# Patient Record
Sex: FEMALE | Race: WHITE | NOT HISPANIC OR LATINO | Employment: FULL TIME | URBAN - METROPOLITAN AREA
[De-identification: names, ages, dates, MRNs, and addresses within clinical notes are randomized per-mention and may not be internally consistent; named-entity substitution may affect disease eponyms.]

---

## 2018-04-19 ENCOUNTER — OFFICE VISIT (OUTPATIENT)
Dept: INTERNAL MEDICINE | Facility: CLINIC | Age: 58
End: 2018-04-19
Payer: COMMERCIAL

## 2018-04-19 VITALS
SYSTOLIC BLOOD PRESSURE: 110 MMHG | HEIGHT: 66 IN | TEMPERATURE: 98 F | WEIGHT: 181 LBS | OXYGEN SATURATION: 97 % | DIASTOLIC BLOOD PRESSURE: 76 MMHG | HEART RATE: 92 BPM | BODY MASS INDEX: 29.09 KG/M2

## 2018-04-19 DIAGNOSIS — E11.9 TYPE 2 DIABETES MELLITUS WITHOUT COMPLICATION, WITH LONG-TERM CURRENT USE OF INSULIN: ICD-10-CM

## 2018-04-19 DIAGNOSIS — Z79.4 TYPE 2 DIABETES MELLITUS WITHOUT COMPLICATION, WITH LONG-TERM CURRENT USE OF INSULIN: ICD-10-CM

## 2018-04-19 DIAGNOSIS — R63.4 UNINTENDED WEIGHT LOSS: ICD-10-CM

## 2018-04-19 DIAGNOSIS — R10.13 EPIGASTRIC PAIN: Primary | ICD-10-CM

## 2018-04-19 DIAGNOSIS — F32.A DEPRESSION, UNSPECIFIED DEPRESSION TYPE: ICD-10-CM

## 2018-04-19 DIAGNOSIS — K59.09 CHRONIC CONSTIPATION: ICD-10-CM

## 2018-04-19 DIAGNOSIS — R07.89 ATYPICAL CHEST PAIN: ICD-10-CM

## 2018-04-19 DIAGNOSIS — E78.5 HYPERLIPIDEMIA, UNSPECIFIED HYPERLIPIDEMIA TYPE: ICD-10-CM

## 2018-04-19 PROCEDURE — 99204 OFFICE O/P NEW MOD 45 MIN: CPT | Mod: ,,, | Performed by: INTERNAL MEDICINE

## 2018-04-19 RX ORDER — TRAZODONE HYDROCHLORIDE 100 MG/1
100 TABLET ORAL NIGHTLY
Qty: 90 TABLET | Refills: 1 | Status: SHIPPED | OUTPATIENT
Start: 2018-04-19 | End: 2018-10-10 | Stop reason: SDUPTHER

## 2018-04-19 RX ORDER — ROSUVASTATIN CALCIUM 20 MG/1
20 TABLET, COATED ORAL DAILY
COMMUNITY
End: 2018-04-19 | Stop reason: SDUPTHER

## 2018-04-19 RX ORDER — INSULIN LISPRO 100 [IU]/ML
INJECTION, SOLUTION INTRAVENOUS; SUBCUTANEOUS
Refills: 3 | COMMUNITY
Start: 2018-03-06 | End: 2018-04-19 | Stop reason: SDUPTHER

## 2018-04-19 RX ORDER — INSULIN LISPRO 100 [IU]/ML
15 INJECTION, SOLUTION INTRAVENOUS; SUBCUTANEOUS
Qty: 3 BOX | Refills: 1 | Status: SHIPPED | OUTPATIENT
Start: 2018-04-19 | End: 2021-01-14

## 2018-04-19 RX ORDER — TRAZODONE HYDROCHLORIDE 100 MG/1
100 TABLET ORAL NIGHTLY
COMMUNITY
End: 2018-04-19 | Stop reason: SDUPTHER

## 2018-04-19 RX ORDER — PAROXETINE 30 MG/1
30 TABLET, FILM COATED ORAL EVERY MORNING
COMMUNITY
End: 2018-04-19 | Stop reason: ALTCHOICE

## 2018-04-19 RX ORDER — ROSUVASTATIN CALCIUM 20 MG/1
20 TABLET, COATED ORAL DAILY
Qty: 90 TABLET | Refills: 1 | Status: SHIPPED | OUTPATIENT
Start: 2018-04-19 | End: 2021-01-14

## 2018-04-19 RX ORDER — PANTOPRAZOLE SODIUM 40 MG/1
40 TABLET, DELAYED RELEASE ORAL DAILY
Qty: 30 TABLET | Refills: 1 | Status: SHIPPED | OUTPATIENT
Start: 2018-04-19 | End: 2021-01-14

## 2018-04-19 RX ORDER — ESCITALOPRAM OXALATE 10 MG/1
10 TABLET ORAL DAILY
Qty: 30 TABLET | Refills: 1 | Status: SHIPPED | OUTPATIENT
Start: 2018-04-19 | End: 2018-09-17

## 2018-04-19 RX ORDER — INSULIN DEGLUDEC 200 U/ML
36 INJECTION, SOLUTION SUBCUTANEOUS NIGHTLY
Qty: 3 SYRINGE | Refills: 1 | Status: SHIPPED | OUTPATIENT
Start: 2018-04-19 | End: 2019-06-25 | Stop reason: CLARIF

## 2018-04-19 RX ORDER — INSULIN DEGLUDEC 200 U/ML
INJECTION, SOLUTION SUBCUTANEOUS
Refills: 3 | COMMUNITY
Start: 2018-03-06 | End: 2018-04-19 | Stop reason: SDUPTHER

## 2018-04-19 RX ORDER — PEN NEEDLE, DIABETIC 30 GX3/16"
NEEDLE, DISPOSABLE MISCELLANEOUS
Qty: 400 EACH | Refills: 1 | Status: SHIPPED | OUTPATIENT
Start: 2018-04-19 | End: 2022-05-23

## 2018-04-19 RX ORDER — HYDROCODONE BITARTRATE AND ACETAMINOPHEN 7.5; 325 MG/1; MG/1
1 TABLET ORAL DAILY PRN
COMMUNITY
End: 2021-01-14

## 2018-04-19 NOTE — PATIENT INSTRUCTIONS
CORRECTION SCALE/BOLUS  FOR FAST/RAPID ACTING INSULIN FOR THE OUTPATIENT SETTING     Date: 04/19/2018    Patient Name: Lisa Chavez             Current pre-meal dose:   units Lantus dose  units at bedtime    NOTICE: Remember that Novolog (Aspart) and Humalog (Lispro) are fast-acting insulins that start working in about 10-15 minutes after injection and reach their peak of action within 1-2 hours average. It is very important that you eat after you inject this type of insulin. Depending on how high your pre-meal blood sugar is, you might need to inject the insulin up to 20 minutes before you eat in order to get the blood sugar down a little before your meal.    Maintain a routine of 3 meals a day and be as consistent as possible with the amount of carbohydrates in each meal. A small snack might be necessary in between meals and at bedtime (see a dietician). Exercise helps you burn calories, carbohydrates/sugar. A 20-30 minute walk after a meal is a good idea! Keep a written record of meals, numbers etc!    Instructions:    Go up on your Lantus by 1 unit each night until your fasting sugar is less than 120.    Test your blood sugar before your 3 main meals (breakfast, lunch and dinner). Compare the results with the numbers below and add the following units (as applicable) to your current pre-meal (not Lantus) dose shown above. If your pre-meal blood sugar is 100 or less, you might or might not need insulin depending on the meal you are going to eat and your sensitivity to insulin. Verify with Dr Gray for specific guidance.                          The times below are estimated averages only!  Not everyone reacts the same way to the same medications.    Explanations:  Onset of action: How soon the insulin is going to start working to lower your blood sugar.  Peak: When the insulin is going to exert its highest action and you could be at risk for a drop on your blood sugar.  Duration: How long the insulin stays in  your system.

## 2018-05-07 DIAGNOSIS — Z79.4 TYPE 2 DIABETES MELLITUS WITHOUT COMPLICATION, WITH LONG-TERM CURRENT USE OF INSULIN: ICD-10-CM

## 2018-05-07 DIAGNOSIS — E11.9 TYPE 2 DIABETES MELLITUS WITHOUT COMPLICATION, WITH LONG-TERM CURRENT USE OF INSULIN: ICD-10-CM

## 2018-05-07 RX ORDER — INSULIN DEGLUDEC 200 U/ML
INJECTION, SOLUTION SUBCUTANEOUS
Qty: 9 ML | OUTPATIENT
Start: 2018-05-07

## 2018-09-19 PROBLEM — M48.062 SPINAL STENOSIS, LUMBAR REGION WITH NEUROGENIC CLAUDICATION: Status: ACTIVE | Noted: 2018-09-19

## 2018-10-10 DIAGNOSIS — F32.A DEPRESSION, UNSPECIFIED DEPRESSION TYPE: ICD-10-CM

## 2018-10-10 RX ORDER — TRAZODONE HYDROCHLORIDE 100 MG/1
TABLET ORAL
Qty: 90 TABLET | Refills: 0 | Status: SHIPPED | OUTPATIENT
Start: 2018-10-10 | End: 2019-03-29 | Stop reason: SDUPTHER

## 2019-01-21 DIAGNOSIS — F32.A DEPRESSION, UNSPECIFIED DEPRESSION TYPE: ICD-10-CM

## 2019-01-21 RX ORDER — TRAZODONE HYDROCHLORIDE 100 MG/1
TABLET ORAL
Qty: 90 TABLET | Refills: 0 | OUTPATIENT
Start: 2019-01-21

## 2019-02-26 DIAGNOSIS — F32.A DEPRESSION, UNSPECIFIED DEPRESSION TYPE: ICD-10-CM

## 2019-02-26 RX ORDER — TRAZODONE HYDROCHLORIDE 100 MG/1
TABLET ORAL
Qty: 90 TABLET | Refills: 0 | OUTPATIENT
Start: 2019-02-26

## 2019-03-29 DIAGNOSIS — F32.A DEPRESSION, UNSPECIFIED DEPRESSION TYPE: ICD-10-CM

## 2019-03-29 RX ORDER — TRAZODONE HYDROCHLORIDE 100 MG/1
TABLET ORAL
Qty: 90 TABLET | Refills: 0 | Status: SHIPPED | OUTPATIENT
Start: 2019-03-29

## 2019-05-17 DIAGNOSIS — F32.A DEPRESSION, UNSPECIFIED DEPRESSION TYPE: ICD-10-CM

## 2019-05-17 RX ORDER — TRAZODONE HYDROCHLORIDE 100 MG/1
TABLET ORAL
Qty: 90 TABLET | Refills: 0 | OUTPATIENT
Start: 2019-05-17

## 2019-06-26 PROBLEM — M47.816 FACET HYPERTROPHY OF LUMBAR REGION: Status: ACTIVE | Noted: 2019-06-26

## 2019-12-04 ENCOUNTER — HOSPITAL ENCOUNTER (EMERGENCY)
Facility: HOSPITAL | Age: 59
Discharge: HOME | End: 2019-12-05
Attending: EMERGENCY MEDICINE
Payer: COMMERCIAL

## 2019-12-04 DIAGNOSIS — K52.9 GASTROENTERITIS: ICD-10-CM

## 2019-12-04 DIAGNOSIS — R11.10 VOMITING: ICD-10-CM

## 2019-12-04 DIAGNOSIS — R11.2 NON-INTRACTABLE VOMITING WITH NAUSEA, UNSPECIFIED VOMITING TYPE: Primary | ICD-10-CM

## 2019-12-04 PROCEDURE — 99284 EMERGENCY DEPT VISIT MOD MDM: CPT | Mod: 25

## 2019-12-04 PROCEDURE — 96374 THER/PROPH/DIAG INJ IV PUSH: CPT

## 2019-12-04 PROCEDURE — 96375 TX/PRO/DX INJ NEW DRUG ADDON: CPT

## 2019-12-05 VITALS
OXYGEN SATURATION: 95 % | HEIGHT: 67 IN | TEMPERATURE: 98 F | RESPIRATION RATE: 16 BRPM | SYSTOLIC BLOOD PRESSURE: 100 MMHG | BODY MASS INDEX: 29.82 KG/M2 | HEART RATE: 71 BPM | DIASTOLIC BLOOD PRESSURE: 55 MMHG | WEIGHT: 190 LBS

## 2019-12-05 LAB
ALBUMIN SERPL BCP-MCNC: 3.7 G/DL (ref 3.5–5.2)
ALP SERPL-CCNC: 84 U/L (ref 55–135)
ALT SERPL W/O P-5'-P-CCNC: 24 U/L (ref 10–44)
ANION GAP SERPL CALC-SCNC: 11 MMOL/L (ref 8–16)
AST SERPL-CCNC: 23 U/L (ref 10–40)
BASOPHILS # BLD AUTO: 0.02 K/UL (ref 0–0.2)
BASOPHILS NFR BLD: 0.4 % (ref 0–1.9)
BILIRUB SERPL-MCNC: 0.8 MG/DL (ref 0.1–1)
BUN SERPL-MCNC: 19 MG/DL (ref 6–20)
CALCIUM SERPL-MCNC: 9 MG/DL (ref 8.7–10.5)
CHLORIDE SERPL-SCNC: 96 MMOL/L (ref 95–110)
CO2 SERPL-SCNC: 26 MMOL/L (ref 23–29)
CREAT SERPL-MCNC: 0.6 MG/DL (ref 0.5–1.4)
DIFFERENTIAL METHOD: ABNORMAL
EOSINOPHIL # BLD AUTO: 0 K/UL (ref 0–0.5)
EOSINOPHIL NFR BLD: 0.2 % (ref 0–8)
ERYTHROCYTE [DISTWIDTH] IN BLOOD BY AUTOMATED COUNT: 12.2 % (ref 11.5–14.5)
EST. GFR  (AFRICAN AMERICAN): >60 ML/MIN/1.73 M^2
EST. GFR  (NON AFRICAN AMERICAN): >60 ML/MIN/1.73 M^2
GLUCOSE SERPL-MCNC: 268 MG/DL (ref 70–110)
HCT VFR BLD AUTO: 41.9 % (ref 37–48.5)
HGB BLD-MCNC: 14.2 G/DL (ref 12–16)
IMM GRANULOCYTES # BLD AUTO: 0.01 K/UL (ref 0–0.04)
IMM GRANULOCYTES NFR BLD AUTO: 0.2 % (ref 0–0.5)
INFLUENZA A, MOLECULAR: NEGATIVE
INFLUENZA B, MOLECULAR: NEGATIVE
LIPASE SERPL-CCNC: 29 U/L (ref 4–60)
LYMPHOCYTES # BLD AUTO: 0.7 K/UL (ref 1–4.8)
LYMPHOCYTES NFR BLD: 12.8 % (ref 18–48)
MCH RBC QN AUTO: 29.1 PG (ref 27–31)
MCHC RBC AUTO-ENTMCNC: 33.9 G/DL (ref 32–36)
MCV RBC AUTO: 86 FL (ref 82–98)
MONOCYTES # BLD AUTO: 0.4 K/UL (ref 0.3–1)
MONOCYTES NFR BLD: 6.9 % (ref 4–15)
NEUTROPHILS # BLD AUTO: 4.2 K/UL (ref 1.8–7.7)
NEUTROPHILS NFR BLD: 79.5 % (ref 38–73)
NRBC BLD-RTO: 0 /100 WBC
PLATELET # BLD AUTO: 193 K/UL (ref 150–350)
PMV BLD AUTO: 9.7 FL (ref 9.2–12.9)
POTASSIUM SERPL-SCNC: 3 MMOL/L (ref 3.5–5.1)
PROT SERPL-MCNC: 6.9 G/DL (ref 6–8.4)
RBC # BLD AUTO: 4.88 M/UL (ref 4–5.4)
SODIUM SERPL-SCNC: 133 MMOL/L (ref 136–145)
SPECIMEN SOURCE: NORMAL
WBC # BLD AUTO: 5.33 K/UL (ref 3.9–12.7)

## 2019-12-05 PROCEDURE — 63600175 PHARM REV CODE 636 W HCPCS: Performed by: STUDENT IN AN ORGANIZED HEALTH CARE EDUCATION/TRAINING PROGRAM

## 2019-12-05 PROCEDURE — 85025 COMPLETE CBC W/AUTO DIFF WBC: CPT

## 2019-12-05 PROCEDURE — 25000003 PHARM REV CODE 250: Performed by: EMERGENCY MEDICINE

## 2019-12-05 PROCEDURE — 80053 COMPREHEN METABOLIC PANEL: CPT

## 2019-12-05 PROCEDURE — 93005 ELECTROCARDIOGRAM TRACING: CPT

## 2019-12-05 PROCEDURE — 25000003 PHARM REV CODE 250: Performed by: STUDENT IN AN ORGANIZED HEALTH CARE EDUCATION/TRAINING PROGRAM

## 2019-12-05 PROCEDURE — 83690 ASSAY OF LIPASE: CPT

## 2019-12-05 PROCEDURE — 87502 INFLUENZA DNA AMP PROBE: CPT

## 2019-12-05 RX ORDER — LANOLIN ALCOHOL/MO/W.PET/CERES
400 CREAM (GRAM) TOPICAL ONCE
Status: COMPLETED | OUTPATIENT
Start: 2019-12-05 | End: 2019-12-05

## 2019-12-05 RX ORDER — KETOROLAC TROMETHAMINE 30 MG/ML
15 INJECTION, SOLUTION INTRAMUSCULAR; INTRAVENOUS
Status: COMPLETED | OUTPATIENT
Start: 2019-12-05 | End: 2019-12-05

## 2019-12-05 RX ORDER — ONDANSETRON 2 MG/ML
8 INJECTION INTRAMUSCULAR; INTRAVENOUS ONCE
Status: COMPLETED | OUTPATIENT
Start: 2019-12-05 | End: 2019-12-05

## 2019-12-05 RX ORDER — POTASSIUM CHLORIDE 20 MEQ/1
40 TABLET, EXTENDED RELEASE ORAL ONCE
Status: COMPLETED | OUTPATIENT
Start: 2019-12-05 | End: 2019-12-05

## 2019-12-05 RX ORDER — ONDANSETRON 4 MG/1
4 TABLET, ORALLY DISINTEGRATING ORAL EVERY 6 HOURS PRN
Qty: 12 TABLET | Refills: 1 | Status: SHIPPED | OUTPATIENT
Start: 2019-12-05 | End: 2021-01-14

## 2019-12-05 RX ADMIN — POTASSIUM CHLORIDE 40 MEQ: 20 TABLET, EXTENDED RELEASE ORAL at 03:12

## 2019-12-05 RX ADMIN — SODIUM CHLORIDE, POTASSIUM CHLORIDE, SODIUM LACTATE AND CALCIUM CHLORIDE 1000 ML: 600; 310; 30; 20 INJECTION, SOLUTION INTRAVENOUS at 02:12

## 2019-12-05 RX ADMIN — KETOROLAC TROMETHAMINE 15 MG: 30 INJECTION, SOLUTION INTRAMUSCULAR at 02:12

## 2019-12-05 RX ADMIN — ALUMINUM HYDROXIDE, MAGNESIUM HYDROXIDE, AND SIMETHICONE 50 ML: 200; 200; 20 SUSPENSION ORAL at 02:12

## 2019-12-05 RX ADMIN — ONDANSETRON 8 MG: 2 INJECTION INTRAMUSCULAR; INTRAVENOUS at 02:12

## 2019-12-05 RX ADMIN — MAGNESIUM OXIDE 400 MG: 400 TABLET ORAL at 03:12

## 2019-12-05 NOTE — ED PROVIDER NOTES
Encounter Date: 12/4/2019       History     Chief Complaint   Patient presents with    Headache    Abdominal Pain    Nausea     HPI     The patient is a 59F w/ PMH IDDN, GERD, HLD presenting with 1 day of vomiting, epigastric abdominal pain, myalgias, headache. Endorses decreased appetite for past week but states vomiting just started today. No nasal congestion, URI symptoms. No fevers. No dysuria. Endorses diarrhea on Monday that has resolved. No chest pain, troubles breathing. ROS otherwise negative.    Review of patient's allergies indicates:   Allergen Reactions    Demerol [meperidine] Nausea Only     Past Medical History:   Diagnosis Date    Anxiety     Cardiac tumor, pericardium, primary     benign    Deaf, left     Depression     Diabetes mellitus, type 2     Heartburn     Hyperlipidemia     IBS (irritable bowel syndrome)      Past Surgical History:   Procedure Laterality Date    APPENDECTOMY      breast implants      CHOLECYSTECTOMY      EPIDURAL STEROID INJECTION INTO LUMBAR SPINE N/A 9/19/2018    Procedure: INJECTION, STEROID, SPINE, LUMBAR, EPIDURAL;  Surgeon: Anthony Carpenter MD;  Location: [facility];  Service: Pain Management;  Laterality: N/A;    INJECTION OF ANESTHETIC AGENT AROUND MEDIAL BRANCH NERVES INNERVATING LUMBAR FACET JOINT Bilateral 6/26/2019    Procedure: BLOCK, NERVE, FACET JOINT, LUMBAR, MEDIAL BRANCH, L4, L5, S1;  Surgeon: Anthony Carpenter MD;  Location: [facility];  Service: Pain Management;  Laterality: Bilateral;    tummy tuck       Family History   Problem Relation Age of Onset    Lung cancer Mother     Diabetes Father     Heart attack Father         <55    Colon cancer Paternal Grandmother     Breast cancer Neg Hx      Social History     Tobacco Use    Smoking status: Never Smoker    Smokeless tobacco: Never Used   Substance Use Topics    Alcohol use: Yes     Comment: occasional    Drug use: No     Review of Systems   Constitutional: Negative for fever.    HENT: Negative for congestion and sore throat.    Respiratory: Negative for shortness of breath.    Cardiovascular: Negative for chest pain.   Gastrointestinal: Positive for abdominal pain, nausea and vomiting.   Genitourinary: Negative for dysuria.   Musculoskeletal: Negative for back pain.   Skin: Negative for rash.   Neurological: Negative for weakness.   Hematological: Does not bruise/bleed easily.       Physical Exam     Initial Vitals [12/04/19 2213]   BP Pulse Resp Temp SpO2   116/66 108 16 98.5 °F (36.9 °C) 98 %      MAP       --         Physical Exam    Constitutional: She appears well-developed and well-nourished.   HENT:   Head: Normocephalic and atraumatic.   Eyes: Right eye exhibits no chemosis, no discharge and no exudate. Left eye exhibits no chemosis, no discharge and no exudate.   Neck: Normal range of motion. No stridor present.   Cardiovascular: Normal rate, regular rhythm and normal heart sounds.   No murmur heard.  Pulmonary/Chest: Breath sounds normal. No respiratory distress. She has no wheezes. She has no rales.   Abdominal: Soft.   Tender in epigastrium. No RUQ or RLQ tenderness. No rigidity.   Musculoskeletal: She exhibits no edema.   Neurological: She is alert.   Skin: Skin is warm and dry.   Psychiatric: She has a normal mood and affect. Her speech is normal and behavior is normal.         ED Course   Procedures  Labs Reviewed   CBC W/ AUTO DIFFERENTIAL   COMPREHENSIVE METABOLIC PANEL   INFLUENZA A AND B ANTIGEN   LIPASE          Imaging Results    None                APC / Resident Notes:   PGY-3 MDM:     Assessment: The patient is a 59F w/ PMH IDDN, GERD, HLD presenting with 1 day of vomiting, epigastric abdominal pain, myalgias, headache. No fevers. No dysuria. Endorses diarrhea on Monday that has resolved. VSS- patient overall well appearing. Labs with mild electrolyte abnormalities- addressed. Flu negative. Patient given IVF and zofran, passed PO challenge. Endorses resolution of  headache. States she feels better. Stable for discharge with outpatient follow up.     Sherrie Schwartz  [facility], PGY3   12/5/2019 3:54 AM                                      Clinical Impression:       ICD-10-CM ICD-9-CM   1. Non-intractable vomiting with nausea, unspecified vomiting type R11.2 787.01   2. Vomiting R11.10 787.03   3. Gastroenteritis K52.9 558.9                             Sherrie Schwartz MD  Resident  12/05/19 0355

## 2022-08-26 ENCOUNTER — HOSPITAL ENCOUNTER (EMERGENCY)
Facility: HOSPITAL | Age: 62
Discharge: HOME | End: 2022-08-26
Attending: EMERGENCY MEDICINE
Payer: COMMERCIAL

## 2022-08-26 VITALS
HEART RATE: 70 BPM | TEMPERATURE: 98 F | WEIGHT: 200 LBS | HEIGHT: 67 IN | BODY MASS INDEX: 31.39 KG/M2 | DIASTOLIC BLOOD PRESSURE: 62 MMHG | OXYGEN SATURATION: 99 % | SYSTOLIC BLOOD PRESSURE: 144 MMHG | RESPIRATION RATE: 16 BRPM

## 2022-08-26 DIAGNOSIS — R19.7 DIARRHEA, UNSPECIFIED TYPE: ICD-10-CM

## 2022-08-26 DIAGNOSIS — E16.2 HYPOGLYCEMIA: Primary | ICD-10-CM

## 2022-08-26 LAB
ALBUMIN SERPL BCP-MCNC: 4.3 G/DL (ref 3.5–5.2)
ALP SERPL-CCNC: 87 U/L (ref 55–135)
ALT SERPL W/O P-5'-P-CCNC: 20 U/L (ref 10–44)
ANION GAP SERPL CALC-SCNC: 11 MMOL/L (ref 8–16)
AST SERPL-CCNC: 19 U/L (ref 10–40)
BASOPHILS # BLD AUTO: 0.06 K/UL (ref 0–0.2)
BASOPHILS NFR BLD: 0.6 % (ref 0–1.9)
BILIRUB SERPL-MCNC: 0.6 MG/DL (ref 0.1–1)
BUN SERPL-MCNC: 18 MG/DL (ref 8–23)
CALCIUM SERPL-MCNC: 10.3 MG/DL (ref 8.7–10.5)
CHLORIDE SERPL-SCNC: 101 MMOL/L (ref 95–110)
CO2 SERPL-SCNC: 28 MMOL/L (ref 23–29)
CREAT SERPL-MCNC: 0.6 MG/DL (ref 0.5–1.4)
DIFFERENTIAL METHOD: NORMAL
EOSINOPHIL # BLD AUTO: 0.3 K/UL (ref 0–0.5)
EOSINOPHIL NFR BLD: 3.1 % (ref 0–8)
ERYTHROCYTE [DISTWIDTH] IN BLOOD BY AUTOMATED COUNT: 12.7 % (ref 11.5–14.5)
EST. GFR  (NO RACE VARIABLE): >60 ML/MIN/1.73 M^2
GLUCOSE SERPL-MCNC: 122 MG/DL (ref 70–110)
GLUCOSE SERPL-MCNC: 75 MG/DL (ref 70–110)
GLUCOSE SERPL-MCNC: 84 MG/DL (ref 70–110)
HCT VFR BLD AUTO: 43.3 % (ref 37–48.5)
HGB BLD-MCNC: 14.4 G/DL (ref 12–16)
IMM GRANULOCYTES # BLD AUTO: 0.03 K/UL (ref 0–0.04)
IMM GRANULOCYTES NFR BLD AUTO: 0.3 % (ref 0–0.5)
LIPASE SERPL-CCNC: 48 U/L (ref 4–60)
LYMPHOCYTES # BLD AUTO: 3.7 K/UL (ref 1–4.8)
LYMPHOCYTES NFR BLD: 37.2 % (ref 18–48)
MAGNESIUM SERPL-MCNC: 1.9 MG/DL (ref 1.6–2.6)
MCH RBC QN AUTO: 29 PG (ref 27–31)
MCHC RBC AUTO-ENTMCNC: 33.3 G/DL (ref 32–36)
MCV RBC AUTO: 87 FL (ref 82–98)
MONOCYTES # BLD AUTO: 0.8 K/UL (ref 0.3–1)
MONOCYTES NFR BLD: 8.1 % (ref 4–15)
NEUTROPHILS # BLD AUTO: 5 K/UL (ref 1.8–7.7)
NEUTROPHILS NFR BLD: 50.7 % (ref 38–73)
NRBC BLD-RTO: 0 /100 WBC
PLATELET # BLD AUTO: 285 K/UL (ref 150–450)
PMV BLD AUTO: 9.3 FL (ref 9.2–12.9)
POTASSIUM SERPL-SCNC: 3 MMOL/L (ref 3.5–5.1)
PROT SERPL-MCNC: 7.9 G/DL (ref 6–8.4)
RBC # BLD AUTO: 4.96 M/UL (ref 4–5.4)
SODIUM SERPL-SCNC: 140 MMOL/L (ref 136–145)
WBC # BLD AUTO: 9.92 K/UL (ref 3.9–12.7)

## 2022-08-26 PROCEDURE — 63600175 PHARM REV CODE 636 W HCPCS: Performed by: EMERGENCY MEDICINE

## 2022-08-26 PROCEDURE — 25000003 PHARM REV CODE 250: Performed by: EMERGENCY MEDICINE

## 2022-08-26 PROCEDURE — 96360 HYDRATION IV INFUSION INIT: CPT

## 2022-08-26 PROCEDURE — 83735 ASSAY OF MAGNESIUM: CPT | Performed by: EMERGENCY MEDICINE

## 2022-08-26 PROCEDURE — 96361 HYDRATE IV INFUSION ADD-ON: CPT

## 2022-08-26 PROCEDURE — 83690 ASSAY OF LIPASE: CPT | Performed by: EMERGENCY MEDICINE

## 2022-08-26 PROCEDURE — 99284 EMERGENCY DEPT VISIT MOD MDM: CPT | Mod: 25

## 2022-08-26 PROCEDURE — 80053 COMPREHEN METABOLIC PANEL: CPT | Performed by: EMERGENCY MEDICINE

## 2022-08-26 PROCEDURE — 82962 GLUCOSE BLOOD TEST: CPT

## 2022-08-26 PROCEDURE — 85025 COMPLETE CBC W/AUTO DIFF WBC: CPT | Performed by: EMERGENCY MEDICINE

## 2022-08-26 RX ADMIN — POTASSIUM BICARBONATE 50 MEQ: 977.5 TABLET, EFFERVESCENT ORAL at 08:08

## 2022-08-26 RX ADMIN — SODIUM CHLORIDE, SODIUM LACTATE, POTASSIUM CHLORIDE, AND CALCIUM CHLORIDE 1000 ML: .6; .31; .03; .02 INJECTION, SOLUTION INTRAVENOUS at 06:08

## 2022-08-26 NOTE — ED PROVIDER NOTES
Encounter Date: 8/26/2022       History     Chief Complaint   Patient presents with    Hypoglycemia     Hypoglycemic, new meds, diarrhea     61-year-old female presents emergency department with low blood sugar, diarrhea.  She says that she is on the road and travels quite a bit.  She had some sushi 2 nights ago and shortly afterwards started with loose watery nonbloody non mucousy diarrhea couple hours later.  Says she has had multiple episodes today.  Every time she drinks something she says it comes out of her.  She is not vomiting but has had some mild nausea.  She has some crampy associated abdominal pain but is mild.  It is mostly cramping in that she has a bowel movement feels somewhat better.  She says that she also started on Ozempic on Monday for diabetes.  She is unsure this could be the cause of it as well.  No one else sick at home with similar symptoms.  She does not have any fever any chills.  She does not have any urinary symptoms such as frequency urgency or burning.  Patient ate a Glucerna prior to arrival.  She wears an insulin pump and it is turned off currently.  Blood sugar on her Dexcom was 50.  Here in the ER it is 75.        Review of patient's allergies indicates:   Allergen Reactions    Cat dander     Tree and shrub pollen     Weed pollen     Meperidine Nausea Only and Nausea And Vomiting     Past Medical History:   Diagnosis Date    Anxiety     Cardiac tumor, pericardium, primary     benign    Deaf, left     Depression     Diabetes mellitus, type 2     Heartburn     Hyperlipidemia     IBS (irritable bowel syndrome)      Past Surgical History:   Procedure Laterality Date    APPENDECTOMY      breast implants      CHOLECYSTECTOMY      COLONOSCOPY  08/17/2020    Dr. Grimaldo    EPIDURAL STEROID INJECTION INTO LUMBAR SPINE N/A 9/19/2018    Procedure: INJECTION, STEROID, SPINE, LUMBAR, EPIDURAL;  Surgeon: Anthony Carpenter MD;  Location: [facility];  Service: Pain Management;   Laterality: N/A;    INJECTION OF ANESTHETIC AGENT AROUND MEDIAL BRANCH NERVES INNERVATING LUMBAR FACET JOINT Bilateral 6/26/2019    Procedure: BLOCK, NERVE, FACET JOINT, LUMBAR, MEDIAL BRANCH, L4, L5, S1;  Surgeon: Anthony Carpenter MD;  Location: [facility];  Service: Pain Management;  Laterality: Bilateral;    tummy tuck       Family History   Problem Relation Age of Onset    Lung cancer Mother     Diabetes Father     Heart attack Father         <55    Colon cancer Paternal Grandmother     Breast cancer Neg Hx      Social History     Tobacco Use    Smoking status: Never Smoker    Smokeless tobacco: Never Used   Substance Use Topics    Alcohol use: Yes     Comment: occasional    Drug use: No     Review of Systems   Constitutional: Negative for chills and fever.   HENT: Negative for congestion and sore throat.    Respiratory: Negative for shortness of breath.    Cardiovascular: Negative for chest pain and palpitations.   Gastrointestinal: Positive for diarrhea. Negative for abdominal pain, nausea and vomiting.   Genitourinary: Negative for dysuria.   Musculoskeletal: Negative for back pain.   Skin: Negative for rash.   Neurological: Negative for weakness and headaches.   Hematological: Does not bruise/bleed easily.   All other systems reviewed and are negative.      Physical Exam     Initial Vitals [08/26/22 1751]   BP Pulse Resp Temp SpO2   132/62 92 18 98.3 °F (36.8 °C) 97 %      MAP       --         Physical Exam    Nursing note and vitals reviewed.  Constitutional: She appears well-developed and well-nourished. No distress.   HENT:   Head: Normocephalic and atraumatic.   Mouth/Throat: No oropharyngeal exudate.   Eyes: Conjunctivae and EOM are normal. Pupils are equal, round, and reactive to light.   Neck: Neck supple. No tracheal deviation present.   Normal range of motion.  Cardiovascular: Normal rate, regular rhythm, normal heart sounds and intact distal pulses.   No murmur heard.  Pulmonary/Chest:  Breath sounds normal. No stridor. No respiratory distress. She has no wheezes. She has no rhonchi. She has no rales.   Abdominal: Abdomen is soft. She exhibits no distension. There is no abdominal tenderness. There is no rebound and no guarding.   Musculoskeletal:         General: No tenderness or edema. Normal range of motion.      Cervical back: Normal range of motion and neck supple.     Neurological: She is alert and oriented to person, place, and time. She has normal strength. No cranial nerve deficit or sensory deficit. GCS score is 15. GCS eye subscore is 4. GCS verbal subscore is 5. GCS motor subscore is 6.   Skin: Skin is warm and dry. Capillary refill takes less than 2 seconds. No rash noted. No erythema. No pallor.   Psychiatric: She has a normal mood and affect. Thought content normal.         ED Course   Procedures  Labs Reviewed   COMPREHENSIVE METABOLIC PANEL - Abnormal; Notable for the following components:       Result Value    Potassium 3.0 (*)     All other components within normal limits   POCT GLUCOSE - Abnormal; Notable for the following components:    POC Glucose 122 (*)     All other components within normal limits   CBC W/ AUTO DIFFERENTIAL   MAGNESIUM   LIPASE   URINALYSIS, REFLEX TO URINE CULTURE   POCT GLUCOSE           Results for orders placed or performed during the hospital encounter of 08/26/22   CBC auto differential   Result Value Ref Range    WBC 9.92 3.90 - 12.70 K/uL    RBC 4.96 4.00 - 5.40 M/uL    Hemoglobin 14.4 12.0 - 16.0 g/dL    Hematocrit 43.3 37.0 - 48.5 %    MCV 87 82 - 98 fL    MCH 29.0 27.0 - 31.0 pg    MCHC 33.3 32.0 - 36.0 g/dL    RDW 12.7 11.5 - 14.5 %    Platelets 285 150 - 450 K/uL    MPV 9.3 9.2 - 12.9 fL    Immature Granulocytes 0.3 0.0 - 0.5 %    Gran # (ANC) 5.0 1.8 - 7.7 K/uL    Immature Grans (Abs) 0.03 0.00 - 0.04 K/uL    Lymph # 3.7 1.0 - 4.8 K/uL    Mono # 0.8 0.3 - 1.0 K/uL    Eos # 0.3 0.0 - 0.5 K/uL    Baso # 0.06 0.00 - 0.20 K/uL    nRBC 0 0 /100 WBC     Gran % 50.7 38.0 - 73.0 %    Lymph % 37.2 18.0 - 48.0 %    Mono % 8.1 4.0 - 15.0 %    Eosinophil % 3.1 0.0 - 8.0 %    Basophil % 0.6 0.0 - 1.9 %    Differential Method Automated    Comprehensive metabolic panel   Result Value Ref Range    Sodium 140 136 - 145 mmol/L    Potassium 3.0 (L) 3.5 - 5.1 mmol/L    Chloride 101 95 - 110 mmol/L    CO2 28 23 - 29 mmol/L    Glucose 84 70 - 110 mg/dL    BUN 18 8 - 23 mg/dL    Creatinine 0.6 0.5 - 1.4 mg/dL    Calcium 10.3 8.7 - 10.5 mg/dL    Total Protein 7.9 6.0 - 8.4 g/dL    Albumin 4.3 3.5 - 5.2 g/dL    Total Bilirubin 0.6 0.1 - 1.0 mg/dL    Alkaline Phosphatase 87 55 - 135 U/L    AST 19 10 - 40 U/L    ALT 20 10 - 44 U/L    Anion Gap 11 8 - 16 mmol/L    eGFR >60.0 >60 mL/min/1.73 m^2   Magnesium   Result Value Ref Range    Magnesium 1.9 1.6 - 2.6 mg/dL   Lipase   Result Value Ref Range    Lipase 48 4 - 60 U/L   POCT glucose   Result Value Ref Range    POC Glucose 75 70 - 110   POCT glucose   Result Value Ref Range    POC Glucose 122 (H) 70 - 110     No orders to display       Imaging Results    None          Medications   lactated ringers bolus 1,000 mL (0 mLs Intravenous Stopped 8/26/22 2041)   potassium bicarbonate disintegrating tablet 50 mEq (50 mEq Oral Given 8/26/22 2037)     Medical Decision Making:   ED Management:  61-year-old female presents emergency department with diarrhea, low blood sugar.  She ate a meal in the emergency department and blood sugar has risen.  Return her insulin pump off.  Blood sugars gone already up to about 175.  Patient is feeling better.  She is hypokalemic.  She potassium has been repleted in the emergency department.  She turned her insulin pump back on.  She has improved.  May have a diarrheal illness/food poisoning from eating sushi or from a viral illness starting.  Less suspicious for side effect of her medication, Ozempic.  Would recommend outpatient follow-up with PCP.  If symptoms change or worsen she should return to the  emergency department.  Doubt any abdominal surgical emergency no tenderness to palpation does not need CT scan on today's emergency department visit.    I had a detailed discussion with the patient and/or guardian regarding: The historical points, exam findings, and diagnostic results supporting the discharge diagnosis, lab results, pertinent radiology results, and the need for outpatient follow-up, for definitive care with a family practitioner and to return to the emergency department if symptoms worsen or persist or if there are any questions or concerns that arise at home. All questions have been answered in detail. Strict return to Emergency Department precautions have been provided.    A dictation software program was used for this note.  Please expect some simple typographical  errors in this note.                ED Course as of 08/26/22 2119   Fri Aug 26, 2022   1758 EKG 5:56 p.m. normal sinus rhythm rate of 89.  Occasional premature ventricular complexes.  Nonspecific T-wave changes.  No STEMI.  EKG interpreted independently by me. [JR]      ED Course User Index  [JR] Hugo Chacon DO             Clinical Impression:   Final diagnoses:  [E16.2] Hypoglycemia (Primary)  [R19.7] Diarrhea, unspecified type          ED Disposition Condition    Discharge Stable        ED Prescriptions     None        Follow-up Information     Follow up With Specialties Details Why Contact Info Additional Information    Idalia Mendoza MD Family Medicine In 3 days  [address] [place] [address]  [phone]       [facility] - Emergency Dept Emergency Medicine  If symptoms worsen [address] [facility] [id number] [phone] 1st floor           Hugo Chacon DO  08/26/22 2119

## 2022-08-26 NOTE — ED NOTES
"LOW BLOOD SUGAR AND DIARRHEA REPORTED.  RECENT NEW MEDS.  PRIVATE ROOM. EVEN AND NON LABORED RESPIRATIONS.  AIRWAY CLEAR.  PULSES REGULAR.  < 3" CAPILLARY REFILL. SKIN WDI.  MAEW.  NON DISTENDED OBESE ABDOMEN. ALERT, ORIENTED AND AMBULATORY. NAD AT THIS TIME. INSULIN PUMP REPORTED TURNED OFF. MONITORING AND CBG AT ARRIVAL.  CALL LIGHT IN REACH.  "

## 2022-08-27 NOTE — DISCHARGE INSTRUCTIONS
RETURN TO EMERGENCY DEPARTMENT WITHOUT FAIL, IF YOUR SYMPTOMS WORSEN, IF YOU GET NEW OR DIFFERENT SYMPTOMS, IF YOU ARE UNABLE TO FOLLOW UP AS DIRECTED, OR IF YOU HAVE ANY CONCERNS OR WORRIES.    Drink plenty of fluids, keep up with the of volume that your losing.  Monitor sugar.  Follow-up with primary care provider.

## 2022-10-31 ENCOUNTER — HOSPITAL ENCOUNTER (EMERGENCY)
Facility: HOSPITAL | Age: 62
Discharge: HOME | End: 2022-10-31
Attending: EMERGENCY MEDICINE
Payer: COMMERCIAL

## 2022-10-31 VITALS
TEMPERATURE: 99 F | RESPIRATION RATE: 16 BRPM | BODY MASS INDEX: 31.8 KG/M2 | HEART RATE: 71 BPM | DIASTOLIC BLOOD PRESSURE: 94 MMHG | OXYGEN SATURATION: 100 % | SYSTOLIC BLOOD PRESSURE: 164 MMHG | WEIGHT: 200 LBS

## 2022-10-31 DIAGNOSIS — I63.9 STROKE: ICD-10-CM

## 2022-10-31 DIAGNOSIS — G51.0 BELL'S PALSY: Primary | ICD-10-CM

## 2022-10-31 LAB
ALBUMIN SERPL BCP-MCNC: 3.8 G/DL (ref 3.5–5.2)
ALP SERPL-CCNC: 89 U/L (ref 55–135)
ALT SERPL W/O P-5'-P-CCNC: 19 U/L (ref 10–44)
ANION GAP SERPL CALC-SCNC: 9 MMOL/L (ref 8–16)
AST SERPL-CCNC: 19 U/L (ref 10–40)
BASOPHILS # BLD AUTO: 0.06 K/UL (ref 0–0.2)
BASOPHILS NFR BLD: 0.8 % (ref 0–1.9)
BILIRUB SERPL-MCNC: 0.3 MG/DL (ref 0.1–1)
BUN SERPL-MCNC: 19 MG/DL (ref 8–23)
CALCIUM SERPL-MCNC: 9.2 MG/DL (ref 8.7–10.5)
CHLORIDE SERPL-SCNC: 102 MMOL/L (ref 95–110)
CHOLEST SERPL-MCNC: 270 MG/DL (ref 120–199)
CHOLEST/HDLC SERPL: 6.3 {RATIO} (ref 2–5)
CO2 SERPL-SCNC: 27 MMOL/L (ref 23–29)
CREAT SERPL-MCNC: 0.7 MG/DL (ref 0.5–1.4)
DIFFERENTIAL METHOD: NORMAL
EOSINOPHIL # BLD AUTO: 0.3 K/UL (ref 0–0.5)
EOSINOPHIL NFR BLD: 3.7 % (ref 0–8)
ERYTHROCYTE [DISTWIDTH] IN BLOOD BY AUTOMATED COUNT: 12.7 % (ref 11.5–14.5)
EST. GFR  (NO RACE VARIABLE): >60 ML/MIN/1.73 M^2
GLUCOSE SERPL-MCNC: 70 MG/DL (ref 70–110)
HCT VFR BLD AUTO: 41.9 % (ref 37–48.5)
HDLC SERPL-MCNC: 43 MG/DL (ref 40–75)
HDLC SERPL: 15.9 % (ref 20–50)
HGB BLD-MCNC: 13.8 G/DL (ref 12–16)
IMM GRANULOCYTES # BLD AUTO: 0.03 K/UL (ref 0–0.04)
IMM GRANULOCYTES NFR BLD AUTO: 0.4 % (ref 0–0.5)
INR PPP: 1
LDLC SERPL CALC-MCNC: ABNORMAL MG/DL (ref 63–159)
LYMPHOCYTES # BLD AUTO: 2.9 K/UL (ref 1–4.8)
LYMPHOCYTES NFR BLD: 37.5 % (ref 18–48)
MCH RBC QN AUTO: 29.7 PG (ref 27–31)
MCHC RBC AUTO-ENTMCNC: 32.9 G/DL (ref 32–36)
MCV RBC AUTO: 90 FL (ref 82–98)
MONOCYTES # BLD AUTO: 0.8 K/UL (ref 0.3–1)
MONOCYTES NFR BLD: 9.8 % (ref 4–15)
NEUTROPHILS # BLD AUTO: 3.7 K/UL (ref 1.8–7.7)
NEUTROPHILS NFR BLD: 47.8 % (ref 38–73)
NONHDLC SERPL-MCNC: 227 MG/DL
NRBC BLD-RTO: 0 /100 WBC
PLATELET # BLD AUTO: 268 K/UL (ref 150–450)
PMV BLD AUTO: 9.4 FL (ref 9.2–12.9)
POTASSIUM SERPL-SCNC: 3.4 MMOL/L (ref 3.5–5.1)
PROT SERPL-MCNC: 7.4 G/DL (ref 6–8.4)
PROTHROMBIN TIME: 12.7 SEC (ref 11.4–13.7)
RBC # BLD AUTO: 4.64 M/UL (ref 4–5.4)
SODIUM SERPL-SCNC: 138 MMOL/L (ref 136–145)
TRIGL SERPL-MCNC: 453 MG/DL (ref 30–150)
TSH SERPL DL<=0.005 MIU/L-ACNC: 3.82 UIU/ML (ref 0.34–5.6)
WBC # BLD AUTO: 7.76 K/UL (ref 3.9–12.7)

## 2022-10-31 PROCEDURE — 93005 ELECTROCARDIOGRAM TRACING: CPT | Performed by: INTERNAL MEDICINE

## 2022-10-31 PROCEDURE — 85025 COMPLETE CBC W/AUTO DIFF WBC: CPT | Performed by: EMERGENCY MEDICINE

## 2022-10-31 PROCEDURE — 25000003 PHARM REV CODE 250: Performed by: EMERGENCY MEDICINE

## 2022-10-31 PROCEDURE — 80053 COMPREHEN METABOLIC PANEL: CPT | Performed by: EMERGENCY MEDICINE

## 2022-10-31 PROCEDURE — 93010 ELECTROCARDIOGRAM REPORT: CPT | Mod: ,,, | Performed by: INTERNAL MEDICINE

## 2022-10-31 PROCEDURE — 84443 ASSAY THYROID STIM HORMONE: CPT | Performed by: EMERGENCY MEDICINE

## 2022-10-31 PROCEDURE — 63600175 PHARM REV CODE 636 W HCPCS: Performed by: EMERGENCY MEDICINE

## 2022-10-31 PROCEDURE — 80061 LIPID PANEL: CPT | Performed by: EMERGENCY MEDICINE

## 2022-10-31 PROCEDURE — 96374 THER/PROPH/DIAG INJ IV PUSH: CPT

## 2022-10-31 PROCEDURE — 93010 EKG 12-LEAD: ICD-10-PCS | Mod: ,,, | Performed by: INTERNAL MEDICINE

## 2022-10-31 PROCEDURE — 85610 PROTHROMBIN TIME: CPT | Performed by: EMERGENCY MEDICINE

## 2022-10-31 PROCEDURE — 99285 EMERGENCY DEPT VISIT HI MDM: CPT | Mod: 25

## 2022-10-31 RX ORDER — PREDNISONE 20 MG/1
60 TABLET ORAL DAILY
Qty: 21 TABLET | Refills: 0 | Status: SHIPPED | OUTPATIENT
Start: 2022-10-31 | End: 2022-11-07

## 2022-10-31 RX ORDER — POTASSIUM CHLORIDE 20 MEQ/1
40 TABLET, EXTENDED RELEASE ORAL ONCE
Status: COMPLETED | OUTPATIENT
Start: 2022-10-31 | End: 2022-10-31

## 2022-10-31 RX ORDER — PROCHLORPERAZINE EDISYLATE 5 MG/ML
10 INJECTION INTRAMUSCULAR; INTRAVENOUS
Status: COMPLETED | OUTPATIENT
Start: 2022-10-31 | End: 2022-10-31

## 2022-10-31 RX ADMIN — POTASSIUM CHLORIDE 40 MEQ: 1500 TABLET, EXTENDED RELEASE ORAL at 05:10

## 2022-10-31 RX ADMIN — PROCHLORPERAZINE EDISYLATE 10 MG: 5 INJECTION INTRAMUSCULAR; INTRAVENOUS at 05:10

## 2022-10-31 NOTE — ED PROVIDER NOTES
Encounter Date: 10/31/2022       History     Chief Complaint   Patient presents with    Numbness     Pt reports left side of face numbness starting last Wednesday. Pt was told to come here form urgent care for cassandra.     This is a 61-year-old female with history of hypertension, hyperlipidemia, diabetes, depression who comes in complaining of left-sided facial numbness.  Patient reports that symptoms started a few days ago.  She has noticed that the left side of her face feels numb.  She also reports that her left eye feared was irritated and dry.  Symptoms have been moderate and constant since onset.  She denies any weakness or facial droop.  She denies any arm or leg numbness.  She denies any exacerbating or alleviating factors.  She reports that she has also been having a headache with this.  Headache was gradual in onset.  She denies any sudden onset.  She denies any blurry or double vision.  No nausea or vomiting. No other complaints.    Review of patient's allergies indicates:   Allergen Reactions    Cat dander     Tree and shrub pollen     Weed pollen     Meperidine Nausea Only and Nausea And Vomiting     Past Medical History:   Diagnosis Date    Anxiety     Cardiac tumor, pericardium, primary     benign    Deaf, left     Depression     Diabetes mellitus, type 2     Heartburn     Hyperlipidemia     IBS (irritable bowel syndrome)      Past Surgical History:   Procedure Laterality Date    APPENDECTOMY      breast implants      CHOLECYSTECTOMY      COLONOSCOPY  08/17/2020    Dr. Grimaldo    EPIDURAL STEROID INJECTION INTO LUMBAR SPINE N/A 9/19/2018    Procedure: INJECTION, STEROID, SPINE, LUMBAR, EPIDURAL;  Surgeon: Anthony Carpenter MD;  Location: [facility];  Service: Pain Management;  Laterality: N/A;    INJECTION OF ANESTHETIC AGENT AROUND MEDIAL BRANCH NERVES INNERVATING LUMBAR FACET JOINT Bilateral 6/26/2019    Procedure: BLOCK, NERVE, FACET JOINT, LUMBAR, MEDIAL BRANCH, L4, L5, S1;  Surgeon: Anthony Carpenter  MD;  Location: [facility];  Service: Pain Management;  Laterality: Bilateral;    tummy tuck       Family History   Problem Relation Age of Onset    Lung cancer Mother     Diabetes Father     Heart attack Father         <55    Colon cancer Paternal Grandmother     Breast cancer Neg Hx      Social History     Tobacco Use    Smoking status: Never    Smokeless tobacco: Never   Substance Use Topics    Alcohol use: Yes     Comment: occasional    Drug use: No     Review of Systems   Constitutional:  Negative for chills and fever.   HENT:  Negative for congestion, sore throat and trouble swallowing.    Respiratory:  Negative for cough and shortness of breath.    Cardiovascular:  Negative for chest pain and palpitations.   Gastrointestinal:  Negative for abdominal pain, diarrhea, nausea and vomiting.   Genitourinary:  Negative for dysuria and flank pain.   Musculoskeletal:  Negative for back pain and neck pain.   Neurological:  Positive for numbness and headaches. Negative for weakness.   Psychiatric/Behavioral:  Negative for agitation and confusion.    All other systems reviewed and are negative.    Physical Exam     Initial Vitals [10/31/22 1439]   BP Pulse Resp Temp SpO2   (!) 135/98 76 18 98.8 °F (37.1 °C) 97 %      MAP       --         Physical Exam    Nursing note and vitals reviewed.  Constitutional: Vital signs are normal. She appears well-developed and well-nourished.  Non-toxic appearance. No distress.   HENT:   Head: Normocephalic and atraumatic.   Mouth/Throat: Oropharynx is clear and moist.   Eyes: Conjunctivae and EOM are normal. Pupils are equal, round, and reactive to light.   Neck: Neck supple.   Normal range of motion.  Cardiovascular:  Normal rate, regular rhythm and intact distal pulses.           Pulmonary/Chest: Breath sounds normal. She has no wheezes.   Abdominal: Abdomen is soft. Bowel sounds are normal. There is no abdominal tenderness.   Musculoskeletal:         General: Normal range of motion.       Cervical back: Normal range of motion and neck supple. No rigidity. No muscular tenderness.     Lymphadenopathy:     She has no cervical adenopathy.     She has no axillary adenopathy.   Neurological: She is alert and oriented to person, place, and time. She has normal strength. A cranial nerve deficit is present. No sensory deficit. Gait normal.   Mild left-sided facial paralysis and numbness noted. Forehead is involved.   Skin: Skin is warm, dry and intact.   Psychiatric: She has a normal mood and affect. Her behavior is normal.       ED Course   Procedures  Labs Reviewed   COMPREHENSIVE METABOLIC PANEL - Abnormal; Notable for the following components:       Result Value    Potassium 3.4 (*)     All other components within normal limits   LIPID PANEL - Abnormal; Notable for the following components:    Cholesterol 270 (*)     Triglycerides 453 (*)     HDL/Cholesterol Ratio 15.9 (*)     Total Cholesterol/HDL Ratio 6.3 (*)     All other components within normal limits   CBC W/ AUTO DIFFERENTIAL   PROTIME-INR   TSH     EKG Readings: (Independently Interpreted)   Time: 1520  Rate: 79 bpm  Normal sinus rhythm. No ST or T-wave abnormalities.  Unchanged from prior.   ECG Results              ECG 12 lead (In process)  Result time 10/31/22 16:54:18      In process by Interface, Lab In [facility] (10/31/22 16:54:18)                   Narrative:    Test Reason : I63.9,    Vent. Rate : 079 BPM     Atrial Rate : 079 BPM     P-R Int : 134 ms          QRS Dur : 088 ms      QT Int : 392 ms       P-R-T Axes : 057 014 026 degrees     QTc Int : 449 ms    Normal sinus rhythm with sinus arrhythmia  Normal ECG  When compared with ECG of 26-AUG-2022 17:56,  Premature ventricular complexes are no longer Present  Nonspecific T wave abnormality, improved in Anterior leads    Referred By: [email or web address]   SELF           Confirmed By:                                   Imaging Results              CT Head Without Contrast (Final result)  Result time  10/31/22 15:12:14      Final result by Avila Maier MD (10/31/22 15:12:14)                   Narrative:    CMS MANDATED QUALITY DATA - CT RADIATION  436    All CT scans at this facility utilize dose modulation, iterative reconstruction, and/or weight based dosing when appropriate to reduce radiation dose to as low as reasonably achievable.    CT HEAD WITHOUT IV CONTRAST    CLINICAL HISTORY:  61 years Female Neuro deficit, acute, stroke suspected    COMPARISON: None    FINDINGS: Negative for acute intracranial hemorrhage, midline shift, or mass effect. Ventricles and sulci are normal in size. Gray-white differentiation is maintained. Cerebellar hemispheres and brainstem are unremarkable. Atherosclerotic calcification of intracranial carotid arteries.    No calvarial lesion or fracture. Mastoid air cells are clear. Paranasal sinuses are clear.    IMPRESSION:    No CT evidence of acute intracranial pathology.    Electronically signed by:  Avila Maier MD  10/31/2022 3:12 PM CDT Workstation: [id number]                                     Medications   prochlorperazine injection Soln 10 mg (has no administration in time range)   potassium chloride SA CR tablet 40 mEq (40 mEq Oral Given 10/31/22 1097)     Medical Decision Making:   Initial Assessment:   This is a 61-year-old female with history of hypertension, hyperlipidemia, diabetes, depression who comes in complaining of left-sided facial numbness.  On examination patient has slight facial paralysis noted.  She is neurovascularly intact otherwise.  Exam is normal otherwise.    Orders included EKG, CBC, CMP, troponin, BNP, head CT, chest x-ray.  Differential Diagnosis:   CVA, TIA, bell's palsy, paresthesias  Independently Interpreted Test(s):   I have ordered and independently interpreted X-rays - see summary below.       <> Summary of X-Ray Reading(s): Head CT was independently reviewed by me and showed no acute intracranial hemorrhage or evidence of  CVA.  Chest x-ray showed no infiltrate or effusion.  I have ordered and independently interpreted EKG Reading(s) - see prior notes  Clinical Tests:   Lab Tests: Ordered and Reviewed       <> Summary of Lab: Labs were reviewed were significant for potassium of 3.4.  It was repleted.  ED Management:  Patient's workup is consistent with Bell's palsy.  Her symptoms are fairly mild.  Head CT did not show any abnormalities.  Patient will be discharged with prednisone.  I discussed with her precautions regarding her diabetes with prednisone.  She will also be discharged with Lacri-Lube for her eye.  She is to tape her eye shut.  She is to return to the ER for any concerns.                        Clinical Impression:   Final diagnoses:  [I63.9] Stroke  [G51.0] Bell's palsy (Primary)        ED Disposition Condition    Discharge Stable          ED Prescriptions       Medication Sig Dispense Start Date End Date Auth. Provider    predniSONE (DELTASONE) 20 MG tablet Take 3 tablets (60 mg total) by mouth once daily. for 7 days 21 tablet 10/31/2022 11/7/2022 Paulina Tan MD    white petrolatum-mineral oil 56.8-42.5% (LACRI-LUBE S.O.P.) 56.8-42.5 % Oint Place into both eyes every evening. 15 g 10/31/2022 -- Paulina Tan MD          Follow-up Information    None          Paulina Tan MD  10/31/22 2803

## 2023-01-10 ENCOUNTER — TELEPHONE (OUTPATIENT)
Dept: OPHTHALMOLOGY | Facility: CLINIC | Age: 63
End: 2023-01-10
Payer: COMMERCIAL

## 2023-01-10 NOTE — TELEPHONE ENCOUNTER
----- Message from Renée Deborah sent at 1/10/2023  2:30 PM CST -----  Regarding: Appt Inquiry  Pt called to to confirm appt set for her 3/23 but would like to know if she will having a mri?    Pts call back: [phone]

## 2023-02-03 ENCOUNTER — TELEPHONE (OUTPATIENT)
Dept: NEUROLOGY | Facility: CLINIC | Age: 63
End: 2023-02-03
Payer: COMMERCIAL

## 2023-02-14 ENCOUNTER — TELEPHONE (OUTPATIENT)
Dept: OPHTHALMOLOGY | Facility: CLINIC | Age: 63
End: 2023-02-14
Payer: COMMERCIAL

## 2023-02-14 NOTE — TELEPHONE ENCOUNTER
----- Message from Mireille Freedman sent at 2/13/2023  5:24 PM CST -----  Regarding: FW: Reports    ----- Message -----  From: Rosanna Edward  Sent: 2/13/2023  10:56 AM CST  To: [facility] Staff  Subject: Reports                                              Name Of Caller:  Floyd from [facility]     Contact Preference?:   Fax:  [phone]      Nature of Call?  Need summary report for the patient faxed to them.

## 2023-03-22 NOTE — PROGRESS NOTES
"    Date:  3/23/2023    ?  Referring Provider:   Hanh Farley MD    Copies of Letters to the Following:   Hanh Farley MD    Chief Complaint:  I saw Lisa Chavez at the [facility] for neuro-ophthalmic evaluation.   She is a 62 y.o. female with a history of HTN, HLD, cardiac tumor, T2DM, spinal stenosis, who presents for evaluation of diplopia.     History:     63 y/o female present to clinic for neuro-ophthalmic evaluation for   findings of 6th Nerve Palsy. Pt present to clinic for resolved vertical   diplopia. She states about X 3 months ago she woke up with diplopia   associated with blurry vision. Diplopia resolves while covering one eye.    She has mild vertigo. Occasional eye allergies, floaters ,flashes of   light, and weekly headaches. No ocular sx/procedures reported.      Eyemeds  At's [facility] PR[facility]     She reports sudden onset of painless diplopia that was oblique in nature and present at distance and near, which occurred in 12/2022 and has slowly resolved.   She reports some dull pain behind her eyes, worse on the right, sometimes with a sharp jab on the right, no temporal tenderness to palpation.     She did not get labs or neuroimaging after her appointment with Dr. Farley.     2/23/2023 Miguelito:      1/6/2023 telephone note:  " Dr. Pérez are referring me to a Neurologist and have ordered a MRI.   I have a question about the brain issues I am having.  Could Nootropic Brain Supplements cause what I am experiencing?   I have taken a Brain Supplement for Memory, Focus etc  and took them around the time that both my left side of my face went numb in late Oct and then again when I developed the double vision.  It is called  Brain Booster by a Company called [employer]."    12/28/2022 Givens (PCP):  "She reports that 10 days ago she noticed that she started having difficulty with her vision.  Seems to worsen throughout the day particularly if she looks at screens for too " "long but she has noticed that it has now become persistent.  She reports that she also has had headache for the past 10 days.  Waxing and waning.  Is located in the frontal aspect of the head and will radiate around to the back.  She reports that given the visual disturbance she was seen by Ophthalmology and they did not find any abnormalities.  Given no findings she was referred back to primary care for further evaluation.  She reports that her symptoms have not worsened but also have not improved.  The headache continues to have waxing and waning quality she has used Tylenol and ibuprofen with minimal relief.  She has not had these symptoms before in the past.  She reports that several months ago she did have episode of facial numbness and was seen at the emergency department and had negative workup at that time.  She is also a known diabetic and follows with endocrinology and is currently on an insulin pump.  Reports that her sugars have been running in the 200s recently.  She denies any recent fever, chills, vomiting.  Denies any diarrhea.  Per her and her  she is at her mental status baseline.  She denies any focal weakness or numbness.  She does report intermittent dizziness over the last 10 days particularly with going from lying to sitting or sitting to standing."    10/31/2022 ED:  "Patient reports that symptoms started a few days ago.  She has noticed that the left side of her face feels numb.  She also reports that her left eye feared was irritated and dry.  Symptoms have been moderate and constant since onset.  She denies any weakness or facial droop.  She denies any arm or leg numbness.  She denies any exacerbating or alleviating factors.  She reports that she has also been having a headache with this.  Headache was gradual in onset.  She denies any sudden onset.  She denies any blurry or double vision.  No nausea or vomiting. No other complaints.  Patient's workup is consistent with Hammonds's " "palsy.  Her symptoms are fairly mild.  Head CT did not show any abnormalities.  Patient will be discharged with prednisone.  I discussed with her precautions regarding her diabetes with prednisone.  She will also be discharged with Lacri-Lube for her eye.  She is to tape her eye shut.  She is to return to the ER for any concerns."  ?  Current Outpatient Medications   Medication Sig Dispense Refill    insulin lispro 100 unit/mL injection Per insulin pump      meloxicam (MOBIC) 15 MG tablet meloxicam 15 mg tablet      paroxetine (PAXIL) 30 MG tablet Take 30 mg by mouth every evening.      traZODone (DESYREL) 100 MG tablet TAKE 1 TABLET BY MOUTH  EVERY EVENING 90 tablet 0     No current facility-administered medications for this visit.     Facility-Administered Medications Ordered in Other Visits   Medication Dose Route Frequency Provider Last Rate Last Admin    lactated ringers infusion   Intravenous Continuous Jacqueline Volpi-Abadie, MD 30 mL/hr at 09/19/18 1420 New Bag at 09/19/18 1420     Review of patient's allergies indicates:   Allergen Reactions    Cat dander     Tree and shrub pollen     Weed pollen     Meperidine Nausea Only and Nausea And Vomiting     Past Medical History:   Diagnosis Date    Anxiety     Cardiac tumor, pericardium, primary     benign    Deaf, left     Depression     Diabetes mellitus, type 2     Heartburn     Hyperlipidemia     IBS (irritable bowel syndrome)      Past Surgical History:   Procedure Laterality Date    APPENDECTOMY      breast implants      CHOLECYSTECTOMY      COLONOSCOPY  08/17/2020     [facility]    EPIDURAL STEROID INJECTION INTO LUMBAR SPINE N/A 9/19/2018    Procedure: INJECTION, STEROID, SPINE, LUMBAR, EPIDURAL;  Surgeon: Anthony Carpenter MD;  Location: [facility];  Service: Pain Management;  Laterality: N/A;    INJECTION OF ANESTHETIC AGENT AROUND MEDIAL BRANCH NERVES INNERVATING LUMBAR FACET JOINT Bilateral 6/26/2019    Procedure: BLOCK, NERVE, FACET JOINT, LUMBAR, " MEDIAL BRANCH, L4, L5, S1;  Surgeon: Anthony Carpenter MD;  Location: [facility];  Service: Pain Management;  Laterality: Bilateral;    tummy tuck       Family History   Problem Relation Age of Onset    Lung cancer Mother     Diabetes Father     Heart attack Father         <55    Colon cancer Paternal Grandmother     Breast cancer Neg Hx      Social History     Socioeconomic History    Marital status:    Occupational History    Occupation: diagnostic    Tobacco Use    Smoking status: Never    Smokeless tobacco: Never   Substance and Sexual Activity    Alcohol use: Yes     Comment: occasional    Drug use: No    Sexual activity: Not Currently   Social History Narrative    Live with        Examination:  She was well-appearing. She was alert and oriented. Attention span and concentration were normal. Speech, language, memory, and general knowledge were intact.      Her distance visual acuity without correction was 20/25+1 in the right eye and 20/20  in the left eye.  Her near visual acuity with correction was J2 in the right eye and J3 in the left eye.     Visual fields were intact to confrontation. She perceived 8/8 OD and 8/8 OS Ishihara color plates correctly. Pupils were brisk to light without an afferent defect. Ocular ductions were full. Orthophoric in primary, right, and left gaze by cross cover. There was no nystagmus. Saccades and pursuits were normal. Lids were symmetric.     No Cogan's lid twitch  No fatigable upgaze    Sensorimotor Examination    Stereopsis  60 seconds of arc    Cross cover at distance  Primary ortho  Left ortho  Right ortho  Down ortho  Up subjective left hyper, no corrective saccade    Flowers Michael  Small left hyper in upgaze  Small exo in primary which extinguishes in right gaze and left gaze    Optic discs appeared normal without swelling or pallor. Pupillary dilation was not necessary for visualization of the optic disc today.    On the remainder of the neurologic  examination, facial sensation was intact. Face was asymmetric, NLFF on left. Good blink rate and closure.     Laboratories Reviewed:     8/2022 A1c 11    I personally reviewed the above labs.  ?  Neuroimaging Reviewed:     1/4/223 [facility] wo contrast  FINDINGS:  Intracranial compartment:     There is no acute brain parenchymal finding.     Ventricles, basal cisterns and sulci are  normal in size for age without evidence of hydrocephalus. There is no acute extra-axial hemorrhage or fluid collection. Please note the sensitivity of CT for subarachnoid hemorrhage is at best approximately 90%.     There is no acute osseous finding.  Included mastoid air cells and paranasal sinuses are clear.     Impression:     1. No acute intracranial abnormality.    I personally reviewed these images and findings include no obvious brainstem, cerebellar, orbital or cavernous sinus abnormalities  ?  Ocular Imaging, Photos, Records Reviewed:     N/a  ?  Impression:  Lisa Chavez has history of HTN, HLD, cardiac tumor, T2DM, spinal stenosis, who presents for evaluation of diplopia. She reports sudden onset of painless diplopia that was oblique in nature and present at distance and near, which occurred in 12/2022 and has slowly resolved. She reports some dull pain behind her eyes, worse on the right, sometimes with a sharp jab on the right, no temporal tenderness to palpation. . Neuro-ophthalmologic examination was notable for excellent visual acuities, full color vision, normal ocular motility and only slight left hypertropia in up gaze. She has residual nasolabial fold flattening from her left facial palsy, but good excursion, frontalis activation and blink. She has some dry eye symptoms. Given the fairly painless (I think her dull retro-orbital pain is likely dry eye related) acute onset oblique diplopia represents left fourth (based on residual left hypertropia) and right sixth (based on Dr. 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 I would like to obtain GCA and myasthenia labs as well as neuroimaging to ensure no structural or inflammatory lesions.   ?  Plan:  1. MRI head and orbits w/wo contrast  2. Labs  3. Follow up with Dr. Farley as planned    Follow-up:  I will see her in follow-up based on results of above  ?  ?  Visit Checklist (as applicable):  1. Status of new and prior symptoms discussed? yes  2. Neuroimaging reviewed/ ordered as appropriate? yes  3. Ocular imaging and photos reviewed/ ordered as appropriate? N/a  4. Plan for work-up and treatment discussed with patient? yes  5. Potential medication side-effects and monitoring plan discussed? N/a  6. Review of outside medical records was performed and pertinent details are summarized in the HPI above? yes    Time spent on this encounter: 60 minutes. This includes face to face time and non-face to face time preparing to see the patient (eg, review of tests), obtaining and/or reviewing separately obtained history, documenting clinical information in the electronic or other health record, independently interpreting results and communicating results to the patient/family/caregiver, or care coordinator.      HADLEY Santos  Neuro-Ophthalmology Consultant

## 2023-03-23 ENCOUNTER — OFFICE VISIT (OUTPATIENT)
Dept: OPHTHALMOLOGY | Facility: CLINIC | Age: 63
End: 2023-03-23
Payer: COMMERCIAL

## 2023-03-23 DIAGNOSIS — H53.2 DIPLOPIA: Primary | ICD-10-CM

## 2023-03-23 DIAGNOSIS — G51.0 BELL'S PALSY: ICD-10-CM

## 2023-03-23 PROCEDURE — 99205 PR OFFICE/OUTPT VISIT, NEW, LEVL V, 60-74 MIN: ICD-10-PCS | Mod: S$GLB,,, | Performed by: STUDENT IN AN ORGANIZED HEALTH CARE EDUCATION/TRAINING PROGRAM

## 2023-03-23 PROCEDURE — 1160F PR REVIEW ALL MEDS BY PRESCRIBER/CLIN PHARMACIST DOCUMENTED: ICD-10-PCS | Mod: CPTII,S$GLB,, | Performed by: STUDENT IN AN ORGANIZED HEALTH CARE EDUCATION/TRAINING PROGRAM

## 2023-03-23 PROCEDURE — 99999 PR PBB SHADOW E&M-EST. PATIENT-LVL III: ICD-10-PCS | Mod: PBBFAC,,, | Performed by: STUDENT IN AN ORGANIZED HEALTH CARE EDUCATION/TRAINING PROGRAM

## 2023-03-23 PROCEDURE — 1159F MED LIST DOCD IN RCRD: CPT | Mod: CPTII,S$GLB,, | Performed by: STUDENT IN AN ORGANIZED HEALTH CARE EDUCATION/TRAINING PROGRAM

## 2023-03-23 PROCEDURE — 99205 OFFICE O/P NEW HI 60 MIN: CPT | Mod: S$GLB,,, | Performed by: STUDENT IN AN ORGANIZED HEALTH CARE EDUCATION/TRAINING PROGRAM

## 2023-03-23 PROCEDURE — 99999 PR PBB SHADOW E&M-EST. PATIENT-LVL III: CPT | Mod: PBBFAC,,, | Performed by: STUDENT IN AN ORGANIZED HEALTH CARE EDUCATION/TRAINING PROGRAM

## 2023-03-23 PROCEDURE — 1159F PR MEDICATION LIST DOCUMENTED IN MEDICAL RECORD: ICD-10-PCS | Mod: CPTII,S$GLB,, | Performed by: STUDENT IN AN ORGANIZED HEALTH CARE EDUCATION/TRAINING PROGRAM

## 2023-03-23 PROCEDURE — 1160F RVW MEDS BY RX/DR IN RCRD: CPT | Mod: CPTII,S$GLB,, | Performed by: STUDENT IN AN ORGANIZED HEALTH CARE EDUCATION/TRAINING PROGRAM

## 2023-03-23 NOTE — LETTER
[facility] - [address] [place] [address]  [address]  [facility] [address]  Phone: [phone]  Fax: [phone]   March 23, 2023    Hanh Farley MD  [address] [facility] [address]    Patient: Lisa Chavez   MR Number: [id number]   YOB: 1960   Date of Visit: 3/23/2023       Dear Dr. Farley :    Thank you for referring Lisa Chavez to me for evaluation. Here is my assessment and plan of care:    Impression:  Lisa Chavez has history of HTN, HLD, cardiac tumor, T2DM, spinal stenosis, who presents for evaluation of diplopia. She reports sudden onset of painless diplopia that was oblique in nature and present at distance and near, which occurred in 12/2022 and has slowly resolved. She reports some dull pain behind her eyes, worse on the right, sometimes with a sharp jab on the right, no temporal tenderness to palpation. . Neuro-ophthalmologic examination was notable for excellent visual acuities, full color vision, normal ocular motility and only slight left hypertropia in up gaze. She has residual nasolabial fold flattening from her left facial palsy, but good excursion, frontalis activation and blink. She has some dry eye symptoms. Given the fairly painless (I think her dull retro-orbital pain is likely dry eye related) acute onset oblique diplopia represents left fourth (based on residual left hypertropia) and right sixth (based on Dr. 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 I would like to obtain GCA and myasthenia labs as well as neuroimaging to ensure no structural or inflammatory lesions.      Plan:  1. MRI head and orbits w/wo contrast  2. Labs  3. Follow up with Dr. Farley as planned    Follow-up:  I will see her in follow-up based on results of above    If you have questions, please do not hesitate to call me. I look forward to following Ms. Lisa Chavez along with  you.    Sincerely,        Mirta Alston MD       CC  No Recipients

## 2023-04-14 ENCOUNTER — HOSPITAL ENCOUNTER (OUTPATIENT)
Dept: RADIOLOGY | Facility: HOSPITAL | Age: 63
Discharge: HOME | End: 2023-04-14
Attending: STUDENT IN AN ORGANIZED HEALTH CARE EDUCATION/TRAINING PROGRAM
Payer: COMMERCIAL

## 2023-04-14 DIAGNOSIS — H53.2 DIPLOPIA: ICD-10-CM

## 2023-04-14 LAB
CREAT SERPL-MCNC: 0.6 MG/DL (ref 0.5–1.4)
SAMPLE: NORMAL

## 2023-04-14 PROCEDURE — 70553 MRI BRAIN STEM W/O & W/DYE: CPT | Mod: 26,,, | Performed by: RADIOLOGY

## 2023-04-14 PROCEDURE — 25500020 PHARM REV CODE 255: Performed by: STUDENT IN AN ORGANIZED HEALTH CARE EDUCATION/TRAINING PROGRAM

## 2023-04-14 PROCEDURE — 70543 MRI ORBT/FAC/NCK W/O &W/DYE: CPT | Mod: TC

## 2023-04-14 PROCEDURE — 70543 MRI ORBT/FAC/NCK W/O &W/DYE: CPT | Mod: 26,,, | Performed by: RADIOLOGY

## 2023-04-14 PROCEDURE — 70543 MRI HEAD-ORBITS W/WO CONTRAST (XPD): ICD-10-PCS | Mod: 26,,, | Performed by: RADIOLOGY

## 2023-04-14 PROCEDURE — 70553 MRI HEAD-ORBITS W/WO CONTRAST (XPD): ICD-10-PCS | Mod: 26,,, | Performed by: RADIOLOGY

## 2023-04-14 PROCEDURE — A9585 GADOBUTROL INJECTION: HCPCS | Performed by: STUDENT IN AN ORGANIZED HEALTH CARE EDUCATION/TRAINING PROGRAM

## 2023-04-14 RX ORDER — GADOBUTROL 604.72 MG/ML
9 INJECTION INTRAVENOUS
Status: COMPLETED | OUTPATIENT
Start: 2023-04-14 | End: 2023-04-14

## 2023-04-14 RX ADMIN — GADOBUTROL 9 ML: 604.72 INJECTION INTRAVENOUS at 02:04

## 2023-05-03 ENCOUNTER — PATIENT MESSAGE (OUTPATIENT)
Dept: OPHTHALMOLOGY | Facility: CLINIC | Age: 63
End: 2023-05-03
Payer: COMMERCIAL